# Patient Record
Sex: FEMALE | Race: BLACK OR AFRICAN AMERICAN | NOT HISPANIC OR LATINO | URBAN - METROPOLITAN AREA
[De-identification: names, ages, dates, MRNs, and addresses within clinical notes are randomized per-mention and may not be internally consistent; named-entity substitution may affect disease eponyms.]

---

## 2018-10-13 ENCOUNTER — EMERGENCY (EMERGENCY)
Facility: HOSPITAL | Age: 63
LOS: 1 days | Discharge: HOME | End: 2018-10-13
Attending: EMERGENCY MEDICINE | Admitting: EMERGENCY MEDICINE
Payer: MEDICAID

## 2018-10-13 VITALS
TEMPERATURE: 98 F | RESPIRATION RATE: 16 BRPM | OXYGEN SATURATION: 100 % | DIASTOLIC BLOOD PRESSURE: 63 MMHG | SYSTOLIC BLOOD PRESSURE: 161 MMHG | HEART RATE: 72 BPM

## 2018-10-13 VITALS
SYSTOLIC BLOOD PRESSURE: 165 MMHG | HEART RATE: 65 BPM | TEMPERATURE: 99 F | RESPIRATION RATE: 16 BRPM | OXYGEN SATURATION: 100 % | DIASTOLIC BLOOD PRESSURE: 76 MMHG

## 2018-10-13 LAB
ALBUMIN SERPL ELPH-MCNC: 3.8 G/DL — SIGNIFICANT CHANGE UP (ref 3.3–5)
ALP SERPL-CCNC: 236 U/L — HIGH (ref 40–120)
ALT FLD-CCNC: 423 U/L — HIGH (ref 4–33)
AST SERPL-CCNC: 411 U/L — HIGH (ref 4–32)
BASOPHILS # BLD AUTO: 0.05 K/UL — SIGNIFICANT CHANGE UP (ref 0–0.2)
BASOPHILS NFR BLD AUTO: 0.8 % — SIGNIFICANT CHANGE UP (ref 0–2)
BILIRUB SERPL-MCNC: 0.3 MG/DL — SIGNIFICANT CHANGE UP (ref 0.2–1.2)
BUN SERPL-MCNC: 19 MG/DL — SIGNIFICANT CHANGE UP (ref 7–23)
CALCIUM SERPL-MCNC: 8.8 MG/DL — SIGNIFICANT CHANGE UP (ref 8.4–10.5)
CHLORIDE SERPL-SCNC: 93 MMOL/L — LOW (ref 98–107)
CO2 SERPL-SCNC: 21 MMOL/L — LOW (ref 22–31)
CREAT SERPL-MCNC: 0.98 MG/DL — SIGNIFICANT CHANGE UP (ref 0.5–1.3)
EOSINOPHIL # BLD AUTO: 0.1 K/UL — SIGNIFICANT CHANGE UP (ref 0–0.5)
EOSINOPHIL NFR BLD AUTO: 1.7 % — SIGNIFICANT CHANGE UP (ref 0–6)
GLUCOSE SERPL-MCNC: 149 MG/DL — HIGH (ref 70–99)
HAV IGM SER-ACNC: NONREACTIVE — SIGNIFICANT CHANGE UP
HBV CORE IGM SER-ACNC: NONREACTIVE — SIGNIFICANT CHANGE UP
HBV SURFACE AG SER-ACNC: NONREACTIVE — SIGNIFICANT CHANGE UP
HCT VFR BLD CALC: 31.1 % — LOW (ref 34.5–45)
HCV AB S/CO SERPL IA: 0.11 S/CO — SIGNIFICANT CHANGE UP
HCV AB SERPL-IMP: SIGNIFICANT CHANGE UP
HGB BLD-MCNC: 10.2 G/DL — LOW (ref 11.5–15.5)
IMM GRANULOCYTES # BLD AUTO: 0.05 # — SIGNIFICANT CHANGE UP
IMM GRANULOCYTES NFR BLD AUTO: 0.8 % — SIGNIFICANT CHANGE UP (ref 0–1.5)
LYMPHOCYTES # BLD AUTO: 1.75 K/UL — SIGNIFICANT CHANGE UP (ref 1–3.3)
LYMPHOCYTES # BLD AUTO: 29.5 % — SIGNIFICANT CHANGE UP (ref 13–44)
MAGNESIUM SERPL-MCNC: 2.4 MG/DL — SIGNIFICANT CHANGE UP (ref 1.6–2.6)
MCHC RBC-ENTMCNC: 26 PG — LOW (ref 27–34)
MCHC RBC-ENTMCNC: 32.8 % — SIGNIFICANT CHANGE UP (ref 32–36)
MCV RBC AUTO: 79.1 FL — LOW (ref 80–100)
MONOCYTES # BLD AUTO: 0.7 K/UL — SIGNIFICANT CHANGE UP (ref 0–0.9)
MONOCYTES NFR BLD AUTO: 11.8 % — SIGNIFICANT CHANGE UP (ref 2–14)
NEUTROPHILS # BLD AUTO: 3.29 K/UL — SIGNIFICANT CHANGE UP (ref 1.8–7.4)
NEUTROPHILS NFR BLD AUTO: 55.4 % — SIGNIFICANT CHANGE UP (ref 43–77)
NRBC # FLD: 0 — SIGNIFICANT CHANGE UP
PHOSPHATE SERPL-MCNC: 3.4 MG/DL — SIGNIFICANT CHANGE UP (ref 2.5–4.5)
PLATELET # BLD AUTO: 295 K/UL — SIGNIFICANT CHANGE UP (ref 150–400)
PMV BLD: 9.3 FL — SIGNIFICANT CHANGE UP (ref 7–13)
POTASSIUM SERPL-MCNC: 4.2 MMOL/L — SIGNIFICANT CHANGE UP (ref 3.5–5.3)
POTASSIUM SERPL-SCNC: 4.2 MMOL/L — SIGNIFICANT CHANGE UP (ref 3.5–5.3)
PROT SERPL-MCNC: 8 G/DL — SIGNIFICANT CHANGE UP (ref 6–8.3)
RBC # BLD: 3.93 M/UL — SIGNIFICANT CHANGE UP (ref 3.8–5.2)
RBC # FLD: 14.3 % — SIGNIFICANT CHANGE UP (ref 10.3–14.5)
SODIUM SERPL-SCNC: 129 MMOL/L — LOW (ref 135–145)
WBC # BLD: 5.94 K/UL — SIGNIFICANT CHANGE UP (ref 3.8–10.5)
WBC # FLD AUTO: 5.94 K/UL — SIGNIFICANT CHANGE UP (ref 3.8–10.5)

## 2018-10-13 PROCEDURE — 99283 EMERGENCY DEPT VISIT LOW MDM: CPT | Mod: 25

## 2018-10-13 NOTE — ED PROVIDER NOTE - NSFOLLOWUPINSTRUCTIONS_ED_ALL_ED_FT
Follow-up with your Primary Care Physician within 24-48 hours.  Please return to the Emergency Department immediately for any new, worsening or concerning symptoms; specifically those included in the attached information brochure.  Copy of your lab work, imaging and/or other testing performed in the ER is attached along with your discharge paperwork.  Please take this to your Primary Care Physician for further discussion, evaluation and comparison with your prior blood work results.    Please stop taking your Simvastatin at this time as this can cause an increase in your liver enzymes.

## 2018-10-13 NOTE — ED ADULT NURSE REASSESSMENT NOTE - CONDITION
pt aa&ox4..appears comfortable.  blood work sent as ordered. given dc instructions by md.  left with sister.

## 2018-10-13 NOTE — ED ADULT NURSE NOTE - OBJECTIVE STATEMENT
Pt sitting quielty with daughter at bedside. Pt calm pleasant affect. Pt st" I came tonight because after spending days at [facility] from Sat to [place] I was discharged and the minute I arrived home they called me to come back because something showed in liver not sure ...but I have no pain and I did not want to return there....they did a lot of test and couldn't find anything while I was there...I went there on Sat because I had a fever...they gave me a couple of days worth of antibiotics .." Resident at bedside. labs sent. vss will cont to assess.

## 2018-10-13 NOTE — ED PROVIDER NOTE - PHYSICAL EXAMINATION
*Gen: NAD, AAO*3  *HEENT: NC/AT, MMM, airway patent, trachea midline  *CV: RRR, S1/S2 present, no murmurs/rubs/gallops  *Resp: no respiratory distress, LCTAB, no wheezing/rales/rhonchi  *Abd: non-distended, soft N/Tx4, no guarding or rigidity  *Neuro: no focal neuro deficits, moving all limbs appropriately  *Extremities: no gross deformity  *Skin: no rashes, no wounds   ~ Beatris Gayle M.D.

## 2018-10-13 NOTE — ED PROVIDER NOTE - PROGRESS NOTE DETAILS
Micah GALLARDO: Labs revealing transaminitis, normal bili.  She cont to be without complaints.  Pt does take statin, will have her hold until PMD follow-up next week.

## 2018-10-13 NOTE — ED PROVIDER NOTE - MEDICAL DECISION MAKING DETAILS
63 year-old female with history of HTN, DM presents to the Emergency Department for abnormal liver enzymes; otherwise asymptomatic.  Plan to do CBC, CMP and reassess/dc.

## 2018-10-13 NOTE — ED ADULT NURSE NOTE - CHIEF COMPLAINT QUOTE
Pt. stated she was admitted to [place] x 1 week for fever of unknown origin.  Pt was discharged yesterday and was also call back for more blood work to r/o abnormality to liver enzyme. Pt. stated she did not want to go back to [place] because they ran several test and did not find anything.

## 2018-10-13 NOTE — ED PROVIDER NOTE - ATTENDING CONTRIBUTION TO CARE
64 y/o F with h/o HTN, DM sent for repeat labs.  Pt was recently admitted to [facility] for fever.  Pt reports fever resolved, feeling better, and was discharged yesterday.  Pt states she then received a call from [facility] telling her to return to recheck her liver enzymes, because they were elevated.  Pt is otherwise asymptomatic.  No fever, jaundice/icterus, abd pain, n/v/c/d.  No etoh use.  No h/o hepatitis.  No recent travel.  Well appearing, lying comfortably in stretcher, awake and alert, nontoxic.  VSS.  NCAT EOMI PERRL no scleral icterus.  Lungs cta bl.  Cards nl S1/S2, RRR, no MRG.  Abd soft ntnd, no palpable organomegaly, neg tirado's sign.  Will recheck labs, hep panel, reassess for outpatient follow-up as pt is currently asymptomatic.

## 2018-10-13 NOTE — ED PROVIDER NOTE - OBJECTIVE STATEMENT
63 year-old female with history of HTN, DM presents to the Emergency Department for abnormal liver enzymes.  Patient had a fever approx. 1 week ago with TMax of 102 with no localizing source; lasted for 3 days and resolved.  Patient admitted at [facility] had a negative workup in the hospital and discharged a 3 days ago.  Patient was told to return to the hospital for abnormal liver enzymes, but choose to come here instead.  Patient reports that she "feels like a race horse"; no fevers, chills, cough, congestion, nausea, vomiting, chest pain, shortness of breath, abdominal pain, diarrhea, rash.  No recent travel. 63 year-old female with history of HTN, DM presents to the Emergency Department for abnormal liver enzymes.  Patient had a fever approx. 1 week ago with TMax of 102 with no localizing source; lasted for 3 days and resolved.  Patient admitted at [facility] had a negative workup in the hospital and discharged a 3 days ago.  Patient was told to return to the hospital for abnormal liver enzymes, but choose to come here instead.  Patient reports that she "feels like a race horse"; no fevers, chills, cough, congestion, nausea, vomiting, chest pain, shortness of breath, abdominal pain, diarrhea, rash.  No recent travel.  Patient on simvastatin.

## 2018-10-13 NOTE — ED ADULT TRIAGE NOTE - CHIEF COMPLAINT QUOTE
Pt. stated she was admitted to [place] x 1 week for fever of unknown origin.  Pt was discharged yesterday and was also call back for more blood work to r/o abnormality to liver enzyme. Pt. stated she did not want to go back to [place] because they ran several test and did not find anything.

## 2022-12-07 ENCOUNTER — OFFICE (OUTPATIENT)
Dept: URBAN - METROPOLITAN AREA CLINIC 77 | Facility: CLINIC | Age: 67
Setting detail: OPHTHALMOLOGY
End: 2022-12-07
Payer: MEDICARE

## 2022-12-07 DIAGNOSIS — H35.373: ICD-10-CM

## 2022-12-07 DIAGNOSIS — E11.3591: ICD-10-CM

## 2022-12-07 DIAGNOSIS — H43.813: ICD-10-CM

## 2022-12-07 DIAGNOSIS — H26.492: ICD-10-CM

## 2022-12-07 DIAGNOSIS — E11.3512: ICD-10-CM

## 2022-12-07 PROCEDURE — 67028 INJECTION EYE DRUG: CPT | Performed by: OPHTHALMOLOGY

## 2022-12-07 PROCEDURE — 92250 FUNDUS PHOTOGRAPHY W/I&R: CPT | Performed by: OPHTHALMOLOGY

## 2022-12-07 PROCEDURE — 99213 OFFICE O/P EST LOW 20 MIN: CPT | Performed by: OPHTHALMOLOGY

## 2022-12-07 ASSESSMENT — KERATOMETRY
OD_K2POWER_DIOPTERS: 44.50
OS_AXISANGLE_DEGREES: 87
OS_K2POWER_DIOPTERS: 44.50
OS_K1POWER_DIOPTERS: 44.00
OD_K1POWER_DIOPTERS: 43.00
OD_AXISANGLE_DEGREES: 100

## 2022-12-07 ASSESSMENT — REFRACTION_AUTOREFRACTION
OS_SPHERE: +0.75
OD_AXIS: 53
OS_AXIS: 77
OD_CYLINDER: -0.25
OD_SPHERE: -0.25
OS_CYLINDER: -0.50

## 2022-12-07 ASSESSMENT — VISUAL ACUITY
OS_BCVA: 20/50
OD_BCVA: 20/100

## 2022-12-07 ASSESSMENT — REFRACTION_MANIFEST
OD_SPHERE: -0.75
OD_AXIS: 65
OS_SPHERE: +0.50
OD_CYLINDER: -0.75
OS_CYLINDER: -1.00
OS_VA1: 20/50
OS_AXIS: 20
OD_VA1: 20/30-2

## 2022-12-07 ASSESSMENT — AXIALLENGTH_DERIVED
OD_AL: 23.6463
OD_AL: 23.9435
OS_AL: 23.3196
OS_AL: 23.1308

## 2022-12-07 ASSESSMENT — SPHEQUIV_DERIVED
OS_SPHEQUIV: 0
OS_SPHEQUIV: 0.5
OD_SPHEQUIV: -0.375
OD_SPHEQUIV: -1.125

## 2022-12-07 ASSESSMENT — CONFRONTATIONAL VISUAL FIELD TEST (CVF)
OD_FINDINGS: FULL
OS_FINDINGS: FULL

## 2022-12-07 ASSESSMENT — TONOMETRY
OD_IOP_MMHG: 12
OS_IOP_MMHG: 11

## 2022-12-14 ENCOUNTER — OFFICE (OUTPATIENT)
Dept: URBAN - METROPOLITAN AREA CLINIC 77 | Facility: CLINIC | Age: 67
Setting detail: OPHTHALMOLOGY
End: 2022-12-14
Payer: MEDICARE

## 2022-12-14 DIAGNOSIS — H43.813: ICD-10-CM

## 2022-12-14 DIAGNOSIS — H35.373: ICD-10-CM

## 2022-12-14 DIAGNOSIS — E11.3522: ICD-10-CM

## 2022-12-14 DIAGNOSIS — E11.3591: ICD-10-CM

## 2022-12-14 PROBLEM — H59.022 LENS FRAGMENTS IN EYE FOLLOWING CATARACT SURGERY; LEFT EYE: Status: ACTIVE | Noted: 2022-12-14

## 2022-12-14 PROCEDURE — 92134 CPTRZ OPH DX IMG PST SGM RTA: CPT | Performed by: OPHTHALMOLOGY

## 2022-12-14 PROCEDURE — 99213 OFFICE O/P EST LOW 20 MIN: CPT | Performed by: OPHTHALMOLOGY

## 2022-12-14 ASSESSMENT — REFRACTION_AUTOREFRACTION
OS_CYLINDER: -0.50
OD_SPHERE: -0.25
OD_AXIS: 53
OS_SPHERE: +0.75
OD_CYLINDER: -0.25
OS_AXIS: 77

## 2022-12-14 ASSESSMENT — REFRACTION_MANIFEST
OD_VA1: 20/30-2
OS_CYLINDER: -1.00
OS_VA1: 20/50
OD_CYLINDER: -0.75
OD_SPHERE: -0.75
OS_SPHERE: +0.50
OS_AXIS: 20
OD_AXIS: 65

## 2022-12-14 ASSESSMENT — SPHEQUIV_DERIVED
OD_SPHEQUIV: -1.125
OS_SPHEQUIV: 0.5
OD_SPHEQUIV: -0.375
OS_SPHEQUIV: 0

## 2022-12-14 ASSESSMENT — VISUAL ACUITY
OS_BCVA: 20/50
OD_BCVA: 20/100

## 2022-12-14 ASSESSMENT — AXIALLENGTH_DERIVED
OS_AL: 23.1308
OS_AL: 23.3196
OD_AL: 23.9435
OD_AL: 23.6463

## 2022-12-14 ASSESSMENT — CONFRONTATIONAL VISUAL FIELD TEST (CVF)
OD_FINDINGS: FULL
OS_FINDINGS: FULL

## 2022-12-14 ASSESSMENT — KERATOMETRY
OD_K2POWER_DIOPTERS: 44.50
OD_AXISANGLE_DEGREES: 100
OS_AXISANGLE_DEGREES: 87
OS_K1POWER_DIOPTERS: 44.00
OS_K2POWER_DIOPTERS: 44.50
OD_K1POWER_DIOPTERS: 43.00

## 2022-12-14 ASSESSMENT — TONOMETRY
OD_IOP_MMHG: 12
OS_IOP_MMHG: 18